# Patient Record
Sex: MALE
[De-identification: names, ages, dates, MRNs, and addresses within clinical notes are randomized per-mention and may not be internally consistent; named-entity substitution may affect disease eponyms.]

---

## 2022-12-01 ENCOUNTER — APPOINTMENT (OUTPATIENT)
Age: 42
End: 2022-12-01
Payer: SELF-PAY

## 2022-12-01 ENCOUNTER — OUTPATIENT (OUTPATIENT)
Dept: OUTPATIENT SERVICES | Facility: HOSPITAL | Age: 42
LOS: 1 days | End: 2022-12-01

## 2022-12-01 VITALS
RESPIRATION RATE: 14 BRPM | SYSTOLIC BLOOD PRESSURE: 119 MMHG | OXYGEN SATURATION: 99 % | BODY MASS INDEX: 25.86 KG/M2 | DIASTOLIC BLOOD PRESSURE: 73 MMHG | WEIGHT: 137 LBS | TEMPERATURE: 98 F | HEART RATE: 59 BPM | HEIGHT: 61 IN

## 2022-12-01 DIAGNOSIS — R21 RASH AND OTHER NONSPECIFIC SKIN ERUPTION: ICD-10-CM

## 2022-12-01 PROBLEM — Z00.00 ENCOUNTER FOR PREVENTIVE HEALTH EXAMINATION: Status: ACTIVE | Noted: 2022-12-01

## 2022-12-01 PROCEDURE — 99202 OFFICE O/P NEW SF 15 MIN: CPT

## 2023-03-24 DIAGNOSIS — R21 RASH AND OTHER NONSPECIFIC SKIN ERUPTION: ICD-10-CM

## 2023-03-24 NOTE — HISTORY OF PRESENT ILLNESS
[FreeTextEntry8] : 42 y o m with no significant past medical history with rash all over his body x 4 years, and x 8 months, had taking penicillin that he had purchased in a [email or web address] to no avail.  Pt states it is itchy and is more so in his lower extremities.

## 2023-03-24 NOTE — PLAN
[FreeTextEntry1] : 42 y o m with generalized body rash, more in lower extremities. \par \par Assessment and Plan:\par \par Skin rash\par - Hydrocortisone cream 0.5% given to the pt from the clinic\par \par Return for follow up as needed.

## 2023-03-24 NOTE — PHYSICAL EXAM
[No Acute Distress] : no acute distress [Speech Grossly Normal] : speech grossly normal [Normal Affect] : the affect was normal [Normal Mood] : the mood was normal [de-identified] : rash more so in lower extremities